# Patient Record
Sex: FEMALE | Race: WHITE
[De-identification: names, ages, dates, MRNs, and addresses within clinical notes are randomized per-mention and may not be internally consistent; named-entity substitution may affect disease eponyms.]

---

## 2020-07-27 ENCOUNTER — HOSPITAL ENCOUNTER (EMERGENCY)
Dept: HOSPITAL 62 - ER | Age: 30
Discharge: HOME | End: 2020-07-27
Payer: MEDICAID

## 2020-07-27 VITALS — SYSTOLIC BLOOD PRESSURE: 120 MMHG | DIASTOLIC BLOOD PRESSURE: 86 MMHG

## 2020-07-27 DIAGNOSIS — R50.9: ICD-10-CM

## 2020-07-27 DIAGNOSIS — Z79.899: ICD-10-CM

## 2020-07-27 DIAGNOSIS — M54.5: ICD-10-CM

## 2020-07-27 DIAGNOSIS — N10: Primary | ICD-10-CM

## 2020-07-27 DIAGNOSIS — R30.0: ICD-10-CM

## 2020-07-27 DIAGNOSIS — Z88.1: ICD-10-CM

## 2020-07-27 LAB
ADD MANUAL DIFF: NO
ALBUMIN SERPL-MCNC: 4.7 G/DL (ref 3.5–5)
ALP SERPL-CCNC: 88 U/L (ref 38–126)
ANION GAP SERPL CALC-SCNC: 9 MMOL/L (ref 5–19)
APPEARANCE UR: (no result)
APTT PPP: YELLOW S
AST SERPL-CCNC: 23 U/L (ref 14–36)
BASOPHILS # BLD AUTO: 0.1 10^3/UL (ref 0–0.2)
BASOPHILS NFR BLD AUTO: 0.4 % (ref 0–2)
BILIRUB DIRECT SERPL-MCNC: 0 MG/DL (ref 0–0.4)
BILIRUB SERPL-MCNC: 0.6 MG/DL (ref 0.2–1.3)
BILIRUB UR QL STRIP: NEGATIVE
BUN SERPL-MCNC: 13 MG/DL (ref 7–20)
CALCIUM: 9.9 MG/DL (ref 8.4–10.2)
CHLORIDE SERPL-SCNC: 103 MMOL/L (ref 98–107)
CO2 SERPL-SCNC: 26 MMOL/L (ref 22–30)
EOSINOPHIL # BLD AUTO: 0.1 10^3/UL (ref 0–0.6)
EOSINOPHIL NFR BLD AUTO: 1.1 % (ref 0–6)
ERYTHROCYTE [DISTWIDTH] IN BLOOD BY AUTOMATED COUNT: 12.7 % (ref 11.5–14)
GLUCOSE SERPL-MCNC: 94 MG/DL (ref 75–110)
GLUCOSE UR STRIP-MCNC: NEGATIVE MG/DL
HCT VFR BLD CALC: 45.1 % (ref 36–47)
HGB BLD-MCNC: 15.4 G/DL (ref 12–15.5)
KETONES UR STRIP-MCNC: NEGATIVE MG/DL
LYMPHOCYTES # BLD AUTO: 2.8 10^3/UL (ref 0.5–4.7)
LYMPHOCYTES NFR BLD AUTO: 24 % (ref 13–45)
MCH RBC QN AUTO: 29.3 PG (ref 27–33.4)
MCHC RBC AUTO-ENTMCNC: 34.2 G/DL (ref 32–36)
MCV RBC AUTO: 86 FL (ref 80–97)
MONOCYTES # BLD AUTO: 0.7 10^3/UL (ref 0.1–1.4)
MONOCYTES NFR BLD AUTO: 6 % (ref 3–13)
NEUTROPHILS # BLD AUTO: 8 10^3/UL (ref 1.7–8.2)
NEUTS SEG NFR BLD AUTO: 68.5 % (ref 42–78)
PH UR STRIP: 7 [PH] (ref 5–9)
PLATELET # BLD: 412 10^3/UL (ref 150–450)
POTASSIUM SERPL-SCNC: 4.4 MMOL/L (ref 3.6–5)
PROT SERPL-MCNC: 7.8 G/DL (ref 6.3–8.2)
PROT UR STRIP-MCNC: 100 MG/DL
RBC # BLD AUTO: 5.27 10^6/UL (ref 3.72–5.28)
SP GR UR STRIP: 1.01
TOTAL CELLS COUNTED % (AUTO): 100 %
UROBILINOGEN UR-MCNC: NEGATIVE MG/DL (ref ?–2)
WBC # BLD AUTO: 11.6 10^3/UL (ref 4–10.5)

## 2020-07-27 PROCEDURE — 99283 EMERGENCY DEPT VISIT LOW MDM: CPT

## 2020-07-27 PROCEDURE — 85025 COMPLETE CBC W/AUTO DIFF WBC: CPT

## 2020-07-27 PROCEDURE — 81001 URINALYSIS AUTO W/SCOPE: CPT

## 2020-07-27 PROCEDURE — 81025 URINE PREGNANCY TEST: CPT

## 2020-07-27 PROCEDURE — 36415 COLL VENOUS BLD VENIPUNCTURE: CPT

## 2020-07-27 PROCEDURE — 80053 COMPREHEN METABOLIC PANEL: CPT

## 2020-07-27 NOTE — ER DOCUMENT REPORT
ED GI/





- General


Chief Complaint: Flank Pain


Stated Complaint: BACK PAIN


Time Seen by Provider: 07/27/20 13:50


Notes: 





Patient is a 30-year-old female presents the emergency department with a chief 

complaint of left lower back pain.  Patient states that her symptoms started 

about a week ago.  Patient states that she thought it was due to her menstrual 

cycle, but she finished her menstrual cycle and ended up having some dysuria.





- Related Data


Allergies/Adverse Reactions: 


                                        





ciprofloxacin [From Cipro] Allergy (Verified 07/27/20 13:54)


   Hives








Home Medications: vitamins





Past Medical History





- General


Information source: Patient





- Social History


Smoking Status: Never Smoker


Chew tobacco use (# tins/day): No


Frequency of alcohol use: Rare


Drug Abuse: None


Family History: Reviewed & Not Pertinent





Review of Systems





- Review of Systems


Notes: 





REVIEW OF SYSTEMS:





CONSTITUTIONAL :    Denies recent illness.  Denies recent unintentional weight 

loss.  Denies fever,  chills, or sweats. 


EENT: Denies eye, ear, throat, or mouth pain, discharge, or symptoms.  Denies 

nasal or sinus congestion.


CARDIOVASCULAR:  Denies chest pain.


RESPIRATORY: Denies shortness of breath, cough, congestion, difficulty pierre

athing, or wheezing. 


GASTROINTESTINAL: Denies nausea, vomiting, and diarrhea.  Denies abdominal pain.

 Denies constipation. 


GENITOURINARY: See HPI.


MUSCULOSKELETAL:  Denies joint pain or swelling.  See HPI.


SKIN:   Denies rash, itchiness, or lesions


HEMATOLOGIC :   Denies easy bruising or bleeding.


LYMPHATIC:  Denies swollen, painful, enlarged glands.


NEUROLOGICAL: Denies no numbness or tingling denies weakness.  Denies headache. 

Denies altered mental status.  Denies alteration in speech.


PSYCHIATRIC:  Denies stress, anxiety, alteration in sleep patterns, or 

depression.





All other systems reviewed and negative.





Physical Exam





- Vital signs


Vitals: 


                                        











Temp Pulse Resp BP Pulse Ox


 


 98.3 F   92   18   126/72 H  98 


 


 07/27/20 12:47  07/27/20 12:47  07/27/20 12:47  07/27/20 12:47  07/27/20 12:47














- Notes


Notes: 





PHYSICAL EXAMINATION:





GENERAL: Appears well, healthy, well-nourished, no acute distress. 





HEAD:  Normocephalic, atraumatic.





EYES:  PERRL, conjunctiva normal, all extraocular movements intact, sclera 

nonicteric





ENT:  Moist mucous membranes. 





NECK: Supple, no noticeable swelling, redness, rash.  Normal range of motion.





LUNGS: Equal breath sounds bilaterally and clear to auscultation.  No wheezes 

rales or rhonchi.





CARDIOVASCULAR: S1-S2, regular rate, regular rhythm.  Radial pulses 2+, normal.





ABDOMEN: Normoactive bowel sounds.  Soft, nontender,  no guarding, no rebound 

tenderness, and no masses palpated.





EXTREMITIES: Normal strength and range of motion, no pitting or edema.  No 

cyanosis. 





NEUROLOGICAL: Moves all extremities upon command.  Strength 5/5 in all 

extremities. 





PSYCH: Normal mood, normal affect.





SKIN: Warm, dry.  No rash, lesions, ulcerations noted.  Normal skin turgor.





BACK: Tenderness to left lower back.





Course





- Re-evaluation


Re-evalutation: 





07/27/20 15:03


Presentation is most consistent with acute pyelonephritis.  Laboratories do 

demonstrate a large amount of white blood cells in the urine as well as 

bacteria.  Patient has had constitutional symptoms at home as well as a fever.  

Left low back tenderness is present on exam.  The remainder laboratories are 

relatively unremarkable without evidence of renal dysfunction.  I do not suspect

an acute appendicitis, biliary pathology, pancreatitis, intra-abdominal abscess,

or tubo-ovarian abscess based on history and examination. Patient has been given

a dose of IV ceftriaxone and a liter of fluids.  Patient is able to tolerate 

oral intake without difficulty.  Will be discharged home on 7 day course of c

ephalexin.  A urine culture has been sent.  Strict return precautions and 

follow-up recommendations have been discussed at length.





- Vital Signs


Vital signs: 


                                        











Temp Pulse Resp BP Pulse Ox


 


 98.4 F   74   16   120/86 H  98 


 


 07/27/20 16:23  07/27/20 16:23  07/27/20 16:23  07/27/20 16:23  07/27/20 16:23














- Laboratory


Result Diagrams: 


                                 07/27/20 14:14





                                 07/27/20 14:14


Laboratory results interpreted by me: 


                                        











  07/27/20 07/27/20





  14:14 14:14


 


WBC  11.6 H 


 


Urine Protein   100 H


 


Urine Blood   MODERATE H


 


Leukocyte Esterase Rfl   LARGE H














Discharge





- Discharge


Clinical Impression: 


Urinary tract infection


Qualifiers:


 Urinary tract infection type: acute pyelonephritis Qualified Code(s): N10 - 

Acute pyelonephritis





Condition: Stable


Disposition: HOME, SELF-CARE


Instructions:  Cephalexin ([facility])


Additional Instructions: 


You have been diagnosed with a condition called pyelonephritis which is an 

infection involving your kidneys and bladder.   Your also being sent home on 

antibiotics.  Please start taking these later on today when you fill the 

prescription.  Complete the course even if you feel better. Please return if you

have persistent vomiting, pass out, have worsening pain, become unable to 

tolerate fluids, or have any other symptoms that are concerning to you.  Please 

follow-up with your primary care physician in the next 24-48 hours.


Prescriptions: 


Cephalexin [Keflex] 500 mg PO BID #14 capsule


Ondansetron [Zofran Odt 4 mg Tablet] 1 - 2 tab PO Q4H PRN #15 tab.rapdis


 PRN Reason: For Nausea/Vomiting